# Patient Record
(demographics unavailable — no encounter records)

---

## 2025-05-09 NOTE — PHYSICAL EXAM
[TextEntry] : female external genitalia within normal limits no lesions, symmetrical labia Speculum inserted no abnormal discharge no lesions vagina within normal limits, cervix appears normal and closed. pap collected. atrophic vagina  Bimanual exam performed and no adnexal masses or tenderness noted bilaterally. Uterus is anteverted and mobile

## 2025-05-09 NOTE — PLAN
[FreeTextEntry1] :  menopausal symptoms: reviewed Bonafide products, and potential HRT r/b. reviewed risk of VTE/breast and uterine cancer with HRT, pt does not want.  reviewed [facility] was synthetic oral estrogen, and it has been shows that transdermal formulations are safe and are a/wless risk.pt will try Bonafide has had em bx with Dr Maria prior to transfer to [place], records reviewed.  urinary sx/atrophy: reviewed vaginal estrogen can help, now a/w systemic symptoms and risk. pt may consider - h/o LEEP, pap not done last visit bc was on menses. cpap collected, will call with results  24 minute consult

## 2025-05-28 NOTE — ASSESSMENT
[FreeTextEntry1] : Elevated Tyrer-Cuzick rescore  No suspicious findings clinically or on ultrasound exam performed in the office today  The patient was reassured  She was asked to follow-up as needed  Consideration for genetic screening and breast MRI were reviewed

## 2025-05-28 NOTE — PROCEDURE
[FreeTextEntry3] : Bilateral breast ultrasound  The patient has a history for dense breast tissue  Four-quadrant survey the left breast demonstrates no developing mass  Four-quadrant survey the right breast demonstrates no developing mass  BI-RADS 2

## 2025-05-28 NOTE — HISTORY OF PRESENT ILLNESS
[FreeTextEntry1] : Patient is referred for evaluation of elevated Tyrer-Cuzick risk score 25%  She denies palpable breast mass, pain, skin thickening  Her mother has a history of postmenopausal breast cancer  The patient has bilateral nipple discharge elicited on palpation

## 2025-07-30 NOTE — DISCUSSION/SUMMARY
[FreeTextEntry1] : MIKE is a 48 year female who presents for On exam, negative CST, normal PVR, no POP.   [] []   f/u All questions answered.

## 2025-07-30 NOTE — HISTORY OF PRESENT ILLNESS
[Vaginal Wall Prolapse] : no [Rectal Prolapse] : no [Unable To Restrain Bowel Movement] : no [Urinary Frequency] : no [Feelings Of Urinary Urgency] : mild [x2] : nocturia two times a night [Urinary Tract Infection] : mild [Constipation Obstructed Defecation] : mild [] : no [Pelvic Pain] : no [Vaginal Pain] : no [FreeTextEntry1] : MIKE is a 48 year female who presents for   Former smoker, 1 pack/day, quit in 2003.    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

## 2025-07-30 NOTE — ADDENDUM
[FreeTextEntry1] : This note was written by Elba Alford, acting as the  for Dr. Mirza. This note accurately reflects the work and decisions made by Dr. Mirza.